# Patient Record
Sex: FEMALE
[De-identification: names, ages, dates, MRNs, and addresses within clinical notes are randomized per-mention and may not be internally consistent; named-entity substitution may affect disease eponyms.]

---

## 2022-06-01 PROBLEM — 110483000: Status: ACTIVE | Noted: 2022-06-01

## 2022-06-01 PROBLEM — 235595009: Status: ACTIVE | Noted: 2022-06-01

## 2022-06-01 PROBLEM — 15167005: Status: ACTIVE | Noted: 2022-06-01

## 2022-06-01 PROBLEM — 274668005: Status: ACTIVE | Noted: 2022-06-01

## 2022-08-03 PROBLEM — 724556004: Status: ACTIVE | Noted: 2022-06-01

## 2024-07-10 ENCOUNTER — P2P PATIENT RECORD (OUTPATIENT)
Age: 44
End: 2024-07-10

## 2024-08-12 ENCOUNTER — OFFICE VISIT (OUTPATIENT)
Dept: URBAN - METROPOLITAN AREA CLINIC 29 | Facility: CLINIC | Age: 44
End: 2024-08-12

## 2024-09-04 ENCOUNTER — OFFICE VISIT (OUTPATIENT)
Dept: URBAN - METROPOLITAN AREA CLINIC 27 | Facility: CLINIC | Age: 44
End: 2024-09-04

## 2024-09-09 ENCOUNTER — OFFICE VISIT (OUTPATIENT)
Dept: URBAN - METROPOLITAN AREA CLINIC 27 | Facility: CLINIC | Age: 44
End: 2024-09-09

## 2024-09-09 NOTE — HPI-ZZZTODAY'S VISIT
Ms. Thornton is a 44-year-old female new patient referred by Dr. Somers for colonoscopy consultation with Dr. Livingston.

## 2025-04-09 ENCOUNTER — OFFICE VISIT (OUTPATIENT)
Dept: URBAN - METROPOLITAN AREA CLINIC 27 | Facility: CLINIC | Age: 45
End: 2025-04-09

## 2025-04-11 ENCOUNTER — DASHBOARD ENCOUNTERS (OUTPATIENT)
Age: 45
End: 2025-04-11

## 2025-04-11 ENCOUNTER — OFFICE VISIT (OUTPATIENT)
Dept: URBAN - METROPOLITAN AREA CLINIC 27 | Facility: CLINIC | Age: 45
End: 2025-04-11
Payer: COMMERCIAL

## 2025-04-11 DIAGNOSIS — Z12.11 SCREENING FOR COLON CANCER: ICD-10-CM

## 2025-04-11 DIAGNOSIS — G40.909 SEIZURE DISORDER: ICD-10-CM

## 2025-04-11 PROBLEM — 128613002: Status: ACTIVE | Noted: 2025-04-11

## 2025-04-11 PROCEDURE — 99203 OFFICE O/P NEW LOW 30 MIN: CPT | Performed by: PHYSICIAN ASSISTANT

## 2025-04-11 PROCEDURE — 99243 OFF/OP CNSLTJ NEW/EST LOW 30: CPT | Performed by: PHYSICIAN ASSISTANT

## 2025-04-11 RX ORDER — LEVETIRACETAM 750 MG/1
1 TABLET TABLET, FILM COATED ORAL
Status: ACTIVE | COMMUNITY

## 2025-04-11 NOTE — HPI-TODAY'S VISIT:
Ms. Thornton is a 45-year-old female seen at the request of Dr. Maranda Somers for CRC screening. A copy of this document will be sent to the referring physician. She states she had an incomplete colonoscopy 3 years ago due to suboptinmal prep. She presents today to schedule a colonoscopy. No known FMHX of CRC or colon polyps. Patient denies nausea, vomiting, abdominal pain, heartburn, diarrhea, melena, hematochezia, anemia and weight loss.